# Patient Record
Sex: MALE | Race: WHITE
[De-identification: names, ages, dates, MRNs, and addresses within clinical notes are randomized per-mention and may not be internally consistent; named-entity substitution may affect disease eponyms.]

---

## 2023-07-28 ENCOUNTER — HOSPITAL ENCOUNTER (OUTPATIENT)
Dept: HOSPITAL 76 - DI | Age: 28
Discharge: HOME | End: 2023-07-28
Attending: PHYSICIAN ASSISTANT
Payer: COMMERCIAL

## 2023-07-28 DIAGNOSIS — M54.2: Primary | ICD-10-CM

## 2023-07-28 NOTE — MRI REPORT
PROCEDURE:  CERVICAL SPINE WO

 

INDICATIONS:  CERVICALGIA

 

TECHNIQUE:  

Noncontrast sagittal T1 spin echo and T2 fast spin echo, sagittal STIR, foraminal oblique sagittal T2
 fast spin echo, and axial gradient echo or T2 fast spin echo through the cervical spine.  

 

COMPARISON:  None.

 

FINDINGS:  

Image quality:  Excellent.  

 

Alignment and Curvature:  There is normal bony alignment.  

 

Bone Marrow:  Marrow demonstrates normal overall signal.  

 

Spinal Cord:  Visualized spinal cord has normal size and signal.  No cerebellar tonsillar herniation.
  

 

Paraspinous Soft Tissues:  No paravertebral masses.  Prevertebral soft tissues are normal in thicknes
s.  

 

C2-C3:  No disc bulge, spinal stenosis or foraminal narrowing.  

 

C3-C4:   No disc bulge, spinal stenosis or foraminal narrowing.

 

C4-C5:  No disc bulge, spinal stenosis or foraminal narrowing.

 

C5-C6:  No disc bulge, spinal stenosis or foraminal narrowing.

 

C6-C7:  No disc bulge, spinal stenosis or foraminal narrowing.

 

C7-T1:  No disc bulge, spinal stenosis or foraminal narrowing.

 

 

 

IMPRESSION:  

 

No visualized spinal stenosis or foraminal narrowing.

 

Reviewed by: Kimberly Kelly MD on 7/28/2023 5:23 PM PDT

Approved by: Kimberly Kelly MD on 7/28/2023 5:23 PM PDT

 

 

Station ID:  [id number]